# Patient Record
Sex: FEMALE | Race: WHITE | NOT HISPANIC OR LATINO | Employment: FULL TIME | URBAN - NONMETROPOLITAN AREA
[De-identification: names, ages, dates, MRNs, and addresses within clinical notes are randomized per-mention and may not be internally consistent; named-entity substitution may affect disease eponyms.]

---

## 2017-07-17 ENCOUNTER — HOSPITAL ENCOUNTER (EMERGENCY)
Facility: HOSPITAL | Age: 22
Discharge: HOME | End: 2017-07-17
Attending: EMERGENCY MEDICINE | Admitting: EMERGENCY MEDICINE

## 2017-07-17 VITALS
WEIGHT: 135 LBS | HEIGHT: 61 IN | RESPIRATION RATE: 17 BRPM | SYSTOLIC BLOOD PRESSURE: 116 MMHG | OXYGEN SATURATION: 94 % | DIASTOLIC BLOOD PRESSURE: 71 MMHG | TEMPERATURE: 97.7 F | HEART RATE: 87 BPM | BODY MASS INDEX: 25.49 KG/M2

## 2017-07-17 DIAGNOSIS — G43.009 MIGRAINE WITHOUT AURA AND WITHOUT STATUS MIGRAINOSUS, NOT INTRACTABLE: Primary | ICD-10-CM

## 2017-07-17 PROCEDURE — 99282 EMERGENCY DEPT VISIT SF MDM: CPT

## 2017-07-17 PROCEDURE — 99283 EMERGENCY DEPT VISIT LOW MDM: CPT

## 2017-07-17 RX ORDER — ERGOCALCIFEROL (VITAMIN D2) 10 MCG
400 TABLET ORAL DAILY
COMMUNITY
End: 2021-01-20

## 2017-07-17 RX ORDER — ONDANSETRON 4 MG/1
4 TABLET, FILM COATED ORAL EVERY 6 HOURS PRN
Qty: 12 TABLET | Refills: 0 | Status: SHIPPED | OUTPATIENT
Start: 2017-07-17

## 2017-07-17 RX ORDER — BUTALBITAL, ACETAMINOPHEN AND CAFFEINE 50; 325; 40 MG/1; MG/1; MG/1
1 TABLET ORAL EVERY 6 HOURS PRN
Qty: 12 TABLET | Refills: 0 | Status: SHIPPED | OUTPATIENT
Start: 2017-07-17 | End: 2021-01-20

## 2017-07-17 NOTE — ED PROVIDER NOTES
Subjective   HPI Comments: 21 states that she had a migraine headache and vomited once today while at work, she continued emergency department to be evaluated because she was asked to leave work due to her symptoms.  She states that her migraine headache is resolving and is a 4 out of 10 at this time she no longer feels nauseated.  She's had headaches in the past but has been a while.  No visual changes no difficulty with speech no numbness.  He states that the headache was in the front portion of her head nonradiating, denies fever chills or stiff neck      History provided by:  Patient   used: No        Review of Systems   Gastrointestinal: Positive for vomiting.   Neurological: Positive for headaches.   All other systems reviewed and are negative.      Past Medical History:   Diagnosis Date   • Liver disease        No Known Allergies    Past Surgical History:   Procedure Laterality Date   • WISDOM TOOTH EXTRACTION         History reviewed. No pertinent family history.    Social History     Social History   • Marital status:      Spouse name: N/A   • Number of children: N/A   • Years of education: N/A     Social History Main Topics   • Smoking status: Never Smoker   • Smokeless tobacco: None   • Alcohol use None   • Drug use: No   • Sexual activity: Not Asked     Other Topics Concern   • None     Social History Narrative   • None           Objective   Physical Exam   Constitutional: She is oriented to person, place, and time. She appears well-developed and well-nourished.   HENT:   Head: Normocephalic.   Eyes: EOM are normal. Pupils are equal, round, and reactive to light.   Neck: Normal range of motion. Neck supple.   Cardiovascular: Normal rate and regular rhythm.    Pulmonary/Chest: Effort normal and breath sounds normal.   Abdominal: Soft. Bowel sounds are normal.   Musculoskeletal: Normal range of motion.   Neurological: She is alert and oriented to person, place, and time. She has  normal reflexes. No cranial nerve deficit. Coordination normal.   Skin: Skin is warm and dry.   Psychiatric: She has a normal mood and affect. Her behavior is normal. Judgment and thought content normal.   Nursing note and vitals reviewed.      Procedures         ED Course  ED Course                  MDM    Final diagnoses:   Migraine without aura and without status migrainosus, not intractable            Roque Ibarra Jr., PA-C  07/17/17 [id number]

## 2021-01-20 ENCOUNTER — OFFICE VISIT (OUTPATIENT)
Dept: OBSTETRICS AND GYNECOLOGY | Facility: CLINIC | Age: 26
End: 2021-01-20

## 2021-01-20 VITALS
HEIGHT: 62 IN | WEIGHT: 179.4 LBS | BODY MASS INDEX: 33.01 KG/M2 | SYSTOLIC BLOOD PRESSURE: 120 MMHG | DIASTOLIC BLOOD PRESSURE: 76 MMHG

## 2021-01-20 DIAGNOSIS — E28.2 POLYCYSTIC OVARY SYNDROME: ICD-10-CM

## 2021-01-20 DIAGNOSIS — N91.5 OLIGOMENORRHEA, UNSPECIFIED TYPE: Primary | ICD-10-CM

## 2021-01-20 PROCEDURE — 99203 OFFICE O/P NEW LOW 30 MIN: CPT | Performed by: PHYSICIAN ASSISTANT

## 2021-01-20 RX ORDER — MONTELUKAST SODIUM 10 MG/1
10 TABLET ORAL NIGHTLY
COMMUNITY

## 2021-01-20 RX ORDER — MULTIPLE VITAMINS W/ MINERALS TAB 9MG-400MCG
1 TAB ORAL DAILY
COMMUNITY

## 2021-01-20 NOTE — PATIENT INSTRUCTIONS
will check labs today and contact patient with results. Pending review will discuss cycle regulation vs possible ovulation induction with Clomid

## 2021-01-23 LAB
ALBUMIN SERPL-MCNC: 4.5 G/DL (ref 3.5–5.2)
ALBUMIN/GLOB SERPL: 1.6 G/DL
ALP SERPL-CCNC: 171 U/L (ref 39–117)
ALT SERPL-CCNC: 123 U/L (ref 1–33)
AST SERPL-CCNC: 66 U/L (ref 1–32)
BILIRUB SERPL-MCNC: 0.4 MG/DL (ref 0–1.2)
BUN SERPL-MCNC: 13 MG/DL (ref 6–20)
BUN/CREAT SERPL: 19.4 (ref 7–25)
CALCIUM SERPL-MCNC: 9.6 MG/DL (ref 8.6–10.5)
CHLORIDE SERPL-SCNC: 99 MMOL/L (ref 98–107)
CO2 SERPL-SCNC: 27.7 MMOL/L (ref 22–29)
CREAT SERPL-MCNC: 0.67 MG/DL (ref 0.57–1)
FSH SERPL-ACNC: 5.1 MIU/ML
GLOBULIN SER CALC-MCNC: 2.9 GM/DL
GLUCOSE SERPL-MCNC: 128 MG/DL (ref 65–99)
INSULIN SERPL-ACNC: 158 UIU/ML (ref 2.6–24.9)
LH SERPL-ACNC: 9.5 MIU/ML
POTASSIUM SERPL-SCNC: 3.9 MMOL/L (ref 3.5–5.2)
PROLACTIN SERPL-MCNC: 14.9 NG/ML (ref 4.8–23.3)
PROT SERPL-MCNC: 7.4 G/DL (ref 6–8.5)
SODIUM SERPL-SCNC: 138 MMOL/L (ref 136–145)
TESTOST FREE SERPL-MCNC: 4.6 PG/ML (ref 0–4.2)
TESTOST SERPL-MCNC: 69.7 NG/DL (ref 10–55)
TSH SERPL DL<=0.005 MIU/L-ACNC: 2.07 UIU/ML (ref 0.27–4.2)

## 2021-04-15 ENCOUNTER — OFFICE VISIT (OUTPATIENT)
Dept: OBSTETRICS AND GYNECOLOGY | Facility: CLINIC | Age: 26
End: 2021-04-15

## 2021-04-15 VITALS
DIASTOLIC BLOOD PRESSURE: 72 MMHG | HEIGHT: 62 IN | WEIGHT: 169 LBS | BODY MASS INDEX: 31.1 KG/M2 | SYSTOLIC BLOOD PRESSURE: 112 MMHG

## 2021-04-15 DIAGNOSIS — Z32.00 POSSIBLE PREGNANCY, NOT CONFIRMED: ICD-10-CM

## 2021-04-15 DIAGNOSIS — Z79.899 FOLLOW-UP ENCOUNTER INVOLVING MEDICATION: ICD-10-CM

## 2021-04-15 DIAGNOSIS — E28.2 POLYCYSTIC OVARY SYNDROME: Primary | ICD-10-CM

## 2021-04-15 DIAGNOSIS — N91.5 OLIGOMENORRHEA, UNSPECIFIED TYPE: ICD-10-CM

## 2021-04-15 LAB
B-HCG UR QL: NEGATIVE
INTERNAL NEGATIVE CONTROL: NEGATIVE
INTERNAL POSITIVE CONTROL: POSITIVE
Lab: NORMAL

## 2021-04-15 PROCEDURE — 81025 URINE PREGNANCY TEST: CPT | Performed by: PHYSICIAN ASSISTANT

## 2021-04-15 PROCEDURE — 99214 OFFICE O/P EST MOD 30 MIN: CPT | Performed by: PHYSICIAN ASSISTANT

## 2021-04-15 NOTE — PATIENT INSTRUCTIONS
We will continue Metformin 500 mg twice daily.  Recommend checking a serum progesterone level on day 21 of next cycle.  If patient has not conceived within the next 2 to 3 months we will consider ovulation induction.

## 2021-04-15 NOTE — PROGRESS NOTES
Subjective   Chief Complaint   Patient presents with   • Follow-up     3 month follow-up from labs done in January       Carolynn Hernandez is a 25 y.o. year old  presenting to be seen for follow-up.  Patient has been on Metformin for almost 3 months for history of PCOS and anovulation.  She has lost 10 and half pounds since starting Metformin in January.  She reports her cycles have become more regular and she had a cycle in February and also a cycle  .  Has not had a cycle thus far in April.  States she has been using an ovulation predictor kit but has not shown positive ovulation.  She is desiring pregnancy.    Past Medical History:   Diagnosis Date   • Asthma    • Liver disease    • Polycystic ovary syndrome         Current Outpatient Medications:   •  metFORMIN (GLUCOPHAGE) 500 MG tablet, TAKE 1 TABLET BY MOUTH WITH DINNER FOR 1 WEEK THEN INCREASE TO TWICE DAILY WITH MEALS, Disp: , Rfl:   •  montelukast (SINGULAIR) 10 MG tablet, Take 10 mg by mouth Every Night., Disp: , Rfl:   •  multivitamin with minerals tablet tablet, Take 1 tablet by mouth Daily., Disp: , Rfl:   •  ondansetron (ZOFRAN) 4 MG tablet, Take 1 tablet by mouth Every 6 (Six) Hours As Needed for Nausea., Disp: 12 tablet, Rfl: 0   No Known Allergies   Past Surgical History:   Procedure Laterality Date   • WISDOM TOOTH EXTRACTION        Social History     Socioeconomic History   • Marital status:      Spouse name: Not on file   • Number of children: Not on file   • Years of education: Not on file   • Highest education level: Not on file   Tobacco Use   • Smoking status: Never Smoker   • Smokeless tobacco: Never Used   Vaping Use   • Vaping Use: Never used   Substance and Sexual Activity   • Drug use: No   • Sexual activity: Yes     Partners: Male     Birth control/protection: None      Family History   Problem Relation Age of Onset   • Diabetes Father    • Coronary artery disease Father    • Stroke Paternal Grandfather    • Breast  "cancer Maternal Grandmother    • Breast cancer Maternal Aunt        Review of Systems   Constitutional: Negative for chills, diaphoresis and fever.   Gastrointestinal: Negative for abdominal pain, constipation, diarrhea, nausea and vomiting.   Genitourinary: Positive for menstrual problem. Negative for dysuria, pelvic pain, vaginal bleeding and vaginal discharge.           Objective   /72   Ht 157.5 cm (62\")   Wt 76.7 kg (169 lb)   LMP 03/16/2021 (Exact Date)   Breastfeeding No   BMI 30.91 kg/m²     Physical Exam  Constitutional:       Appearance: Normal appearance. She is well-developed and well-groomed.   Eyes:      General: Lids are normal.      Extraocular Movements: Extraocular movements intact.      Conjunctiva/sclera: Conjunctivae normal.   Skin:     General: Skin is warm and dry.      Findings: No lesion.   Neurological:      General: No focal deficit present.      Mental Status: She is alert and oriented to person, place, and time.   Psychiatric:         Attention and Perception: Attention normal.         Mood and Affect: Mood normal.         Speech: Speech normal.         Behavior: Behavior is cooperative.            Result Review :   The following data was reviewed by: Maty Mary PA-C on 04/15/2021:   PCOS labs              Assessment and Plan  Diagnoses and all orders for this visit:    1. Polycystic ovary syndrome (Primary)    2. Oligomenorrhea, unspecified type    3. Follow-up encounter involving medication    4. Possible pregnancy, not confirmed  -     POC Pregnancy, Urine      Patient Instructions   We will continue Metformin 500 mg twice daily.  Recommend checking a serum progesterone level on day 21 of next cycle.  If patient has not conceived within the next 2 to 3 months we will consider ovulation induction.             This note was electronically signed.    Maty Mary PA-C   April 15, 2021  "

## 2021-04-28 ENCOUNTER — TELEPHONE (OUTPATIENT)
Dept: OBSTETRICS AND GYNECOLOGY | Facility: CLINIC | Age: 26
End: 2021-04-28

## 2021-04-28 NOTE — TELEPHONE ENCOUNTER
----- Message from Carolynn Hernandez sent at 4/27/2021  8:36 PM EDT -----  Regarding: Visit Follow-Up Question  Contact: [phone]  I know I am suppose to do the bloodwork for ovulation 21 days after my period starts. My period has not came this month at all. I don't know why and haven't really done anything differently than usual. So I was wondering if there was anything I needed to do.

## 2021-04-29 NOTE — TELEPHONE ENCOUNTER
Please inform patient need to wait for a period to check progesterone level on day 21 of cycle. Thanks

## 2021-05-20 ENCOUNTER — TELEPHONE (OUTPATIENT)
Dept: OBSTETRICS AND GYNECOLOGY | Facility: CLINIC | Age: 26
End: 2021-05-20

## 2021-05-26 ENCOUNTER — TELEPHONE (OUTPATIENT)
Dept: OBSTETRICS AND GYNECOLOGY | Facility: CLINIC | Age: 26
End: 2021-05-26

## 2021-06-01 ENCOUNTER — TELEPHONE (OUTPATIENT)
Dept: OBSTETRICS AND GYNECOLOGY | Facility: CLINIC | Age: 26
End: 2021-06-01

## 2021-06-01 NOTE — TELEPHONE ENCOUNTER
----- Message from Carolynn Hernandez sent at 5/28/2021  7:04 PM EDT -----  Regarding: RE:message  Contact: [phone]  I saw where Mireille tried to call me on Thursday and I tried to call her back twice and they kept me on hold for like 30 mins each time. I was just trying to call her back and see what the game plan is .

## 2021-06-02 DIAGNOSIS — N91.2 AMENORRHEA: Primary | ICD-10-CM

## 2021-06-02 RX ORDER — PROGESTERONE 200 MG/1
CAPSULE ORAL
Qty: 20 CAPSULE | Refills: 0 | Status: SHIPPED | OUTPATIENT
Start: 2021-06-02

## 2021-06-02 NOTE — TELEPHONE ENCOUNTER
Spoke with patient.  She still has not had menses since March 9.  Reports she had a negative home pregnancy test last week.  Orders placed for hCG at the outpatient lab which patient will do tomorrow.  We will then do progesterone for 10 days for withdrawal bleed and then Clomid days 5 through 9 of her cycle.

## 2021-06-03 ENCOUNTER — LAB (OUTPATIENT)
Dept: LAB | Facility: HOSPITAL | Age: 26
End: 2021-06-03

## 2021-06-03 DIAGNOSIS — N91.2 AMENORRHEA: ICD-10-CM

## 2021-06-03 LAB — HCG INTACT+B SERPL-ACNC: <0.1 MIU/ML

## 2021-06-03 PROCEDURE — 36415 COLL VENOUS BLD VENIPUNCTURE: CPT

## 2021-06-03 PROCEDURE — 84702 CHORIONIC GONADOTROPIN TEST: CPT

## 2021-06-10 ENCOUNTER — TELEPHONE (OUTPATIENT)
Dept: OBSTETRICS AND GYNECOLOGY | Facility: CLINIC | Age: 26
End: 2021-06-10

## 2021-06-10 DIAGNOSIS — E28.2 POLYCYSTIC OVARY SYNDROME: Primary | ICD-10-CM

## 2021-06-10 DIAGNOSIS — R74.8 ELEVATED LIVER ENZYMES: ICD-10-CM

## 2021-06-10 NOTE — TELEPHONE ENCOUNTER
----- Message from Carolynn Hernandez sent at 6/9/2021  9:54 PM EDT -----  Regarding: Prescription Question  Contact: [phone]  Hey I tried to get my metformin filler but they said they needed a call back. I took my last dose today.

## 2021-06-10 NOTE — TELEPHONE ENCOUNTER
Please let her know I have sent in 3 refills. I would like to recheck CMP as liver enzymes elevated and need to monitor this with metformin. Thanks

## 2021-07-01 ENCOUNTER — TRANSCRIBE ORDERS (OUTPATIENT)
Dept: ADMINISTRATIVE | Facility: HOSPITAL | Age: 26
End: 2021-07-01

## 2021-07-01 DIAGNOSIS — N63.0 BREAST NODULE: Primary | ICD-10-CM

## 2021-07-05 ENCOUNTER — LAB (OUTPATIENT)
Dept: LAB | Facility: HOSPITAL | Age: 26
End: 2021-07-05

## 2021-07-05 DIAGNOSIS — E28.2 POLYCYSTIC OVARY SYNDROME: ICD-10-CM

## 2021-07-05 DIAGNOSIS — R74.8 ELEVATED LIVER ENZYMES: ICD-10-CM

## 2021-07-05 LAB
ALBUMIN SERPL-MCNC: 4.7 G/DL (ref 3.5–5.2)
ALBUMIN/GLOB SERPL: 1.8 G/DL
ALP SERPL-CCNC: 149 U/L (ref 39–117)
ALT SERPL W P-5'-P-CCNC: 175 U/L (ref 1–33)
ANION GAP SERPL CALCULATED.3IONS-SCNC: 12.2 MMOL/L (ref 5–15)
AST SERPL-CCNC: 118 U/L (ref 1–32)
BILIRUB SERPL-MCNC: 0.3 MG/DL (ref 0–1.2)
BUN SERPL-MCNC: 11 MG/DL (ref 6–20)
BUN/CREAT SERPL: 14.9 (ref 7–25)
CALCIUM SPEC-SCNC: 9.6 MG/DL (ref 8.6–10.5)
CHLORIDE SERPL-SCNC: 100 MMOL/L (ref 98–107)
CO2 SERPL-SCNC: 24.8 MMOL/L (ref 22–29)
CREAT SERPL-MCNC: 0.74 MG/DL (ref 0.57–1)
GFR SERPL CREATININE-BSD FRML MDRD: 95 ML/MIN/1.73
GLOBULIN UR ELPH-MCNC: 2.6 GM/DL
GLUCOSE SERPL-MCNC: 92 MG/DL (ref 65–99)
POTASSIUM SERPL-SCNC: 4.4 MMOL/L (ref 3.5–5.2)
PROT SERPL-MCNC: 7.3 G/DL (ref 6–8.5)
SODIUM SERPL-SCNC: 137 MMOL/L (ref 136–145)

## 2021-07-05 PROCEDURE — 80053 COMPREHEN METABOLIC PANEL: CPT

## 2021-07-05 PROCEDURE — 36415 COLL VENOUS BLD VENIPUNCTURE: CPT

## 2021-07-07 ENCOUNTER — TELEPHONE (OUTPATIENT)
Dept: OBSTETRICS AND GYNECOLOGY | Facility: CLINIC | Age: 26
End: 2021-07-07

## 2021-07-07 DIAGNOSIS — N83.9 PROBLEMS WITH OVULATION: Primary | ICD-10-CM

## 2021-07-07 PROCEDURE — 36415 COLL VENOUS BLD VENIPUNCTURE: CPT | Performed by: PHYSICIAN ASSISTANT

## 2021-07-07 PROCEDURE — 84144 ASSAY OF PROGESTERONE: CPT | Performed by: PHYSICIAN ASSISTANT

## 2021-07-07 NOTE — TELEPHONE ENCOUNTER
----- Message from Carolynn Hernandez sent at 7/6/2021 11:54 PM EDT -----  Regarding: Test Results Question  Contact: [phone]  I was wondering if they did the draw to check for the ovulation? I didn't see it in the labs because that was the whole reason for coming on the 5th.

## 2021-07-07 NOTE — TELEPHONE ENCOUNTER
Please let patient know that progesterone level  was not on the order with CMP, however, I have spoken to Torsten in the lab and they can add the progesterone to the blood drawn on July 5 so I placed order for them to add that on and should be back in 1-2 days. Thanks

## 2021-07-08 ENCOUNTER — TELEPHONE (OUTPATIENT)
Dept: OBSTETRICS AND GYNECOLOGY | Facility: CLINIC | Age: 26
End: 2021-07-08

## 2021-07-08 LAB — PROGEST SERPL-MCNC: <0.1 NG/ML

## 2021-07-08 NOTE — TELEPHONE ENCOUNTER
Patient also wanted to see if you thought the Metformin could be the reason she has elevated liver enzymes?

## 2021-07-08 NOTE — TELEPHONE ENCOUNTER
----- Message from Carolynn Hernandez sent at 7/8/2021  2:41 PM EDT -----  Regarding: Non-Urgent Medical Question  Contact: [phone]  I am just emailing back to say that my gi isn't going to start me on any medication and just suggest that I lose weight. I would like to continue at least to try to get my periods on track. I don't understand why they aren't on track and why I am not ovulating. If you don't suggest the fertility medication that is fine but I would at least like to fix the problem at hand.

## 2021-07-10 NOTE — TELEPHONE ENCOUNTER
Please let patient know that as far as regulating her menses the best option would be to do a cyclic progesterone every month to induce  menses.  With her elevated liver enzymes, the estrogen in other birth control could potentially elevate her enzymes even more.  Also let her know that her liver enzymes were already elevated before she started metformin and it is very unlikely that metformin contributed to the further increase in her enzymes. It could just be fatty liver as her GI has diagnosed. If she does lose weight, this will be very beneficial also to increase chances of ovulation and regulation of her menses. Thanks

## 2021-07-27 ENCOUNTER — TELEPHONE (OUTPATIENT)
Dept: OBSTETRICS AND GYNECOLOGY | Facility: CLINIC | Age: 26
End: 2021-07-27

## 2021-07-27 NOTE — TELEPHONE ENCOUNTER
Please let patient know if her GI physician is OK with patient resuming Metformin I think it is OK to go back on the 500 mg bid with meals. Thanks

## 2021-07-27 NOTE — TELEPHONE ENCOUNTER
----- Message from Carolynn Hernandez sent at 7/26/2021  7:44 PM EDT -----  Regarding: RE: Non-Urgent Medical Question  Contact: [phone]  So is it okay for me to start it back.

## 2021-08-09 ENCOUNTER — HOSPITAL ENCOUNTER (OUTPATIENT)
Dept: MAMMOGRAPHY | Facility: HOSPITAL | Age: 26
Discharge: HOME | End: 2021-08-09

## 2021-08-09 ENCOUNTER — HOSPITAL ENCOUNTER (OUTPATIENT)
Dept: ULTRASOUND IMAGING | Facility: HOSPITAL | Age: 26
Discharge: HOME | End: 2021-08-09

## 2021-08-09 DIAGNOSIS — N63.0 BREAST NODULE: ICD-10-CM

## 2021-08-09 PROCEDURE — 76642 ULTRASOUND BREAST LIMITED: CPT

## 2021-11-18 ENCOUNTER — TRANSCRIBE ORDERS (OUTPATIENT)
Dept: ADMINISTRATIVE | Facility: HOSPITAL | Age: 26
End: 2021-11-18

## 2021-11-18 DIAGNOSIS — R92.8 ABNORMAL FINDINGS ON DIAGNOSTIC IMAGING OF BREAST: Primary | ICD-10-CM

## 2022-02-03 ENCOUNTER — HOSPITAL ENCOUNTER (OUTPATIENT)
Dept: MAMMOGRAPHY | Facility: HOSPITAL | Age: 27
End: 2022-02-03

## 2022-02-08 ENCOUNTER — HOSPITAL ENCOUNTER (OUTPATIENT)
Dept: ULTRASOUND IMAGING | Facility: HOSPITAL | Age: 27
Discharge: HOME | End: 2022-02-08

## 2022-02-08 ENCOUNTER — HOSPITAL ENCOUNTER (OUTPATIENT)
Dept: MAMMOGRAPHY | Facility: HOSPITAL | Age: 27
Discharge: HOME | End: 2022-02-08

## 2022-02-08 DIAGNOSIS — R92.8 ABNORMAL FINDINGS ON DIAGNOSTIC IMAGING OF BREAST: ICD-10-CM

## 2022-02-08 PROCEDURE — 76642 ULTRASOUND BREAST LIMITED: CPT

## 2022-04-01 NOTE — PROGRESS NOTES
Subjective   Chief Complaint   Patient presents with   • Menstrual Problem     Patient states that she only has a period every 3 months.  Trying to conceive.  Patient was told that she has PCOS       Carolynn Hernandez is a 25 y.o. year old  presenting to be seen for irregular menses and she is desiring conception.  Patient reports a previous diagnosis of polycystic ovary syndrome around the age of 18 or 19.  Patient had had ParaGard IUD in for about 1-1/2 to 2 years and IUD was removed in 2020.  She reports that she did have a monthly regular bleed with the ParaGard.  She reports after ParaGard was removed she has been having periods about every 3 months with moderate flow lasting 7-8 days.  She had a last menses  and the menses prior to that was in September which was triggered by progesterone given by another provider.   She reports issues with acne for a few years.  She has had about a 50 pound weight gain in the last 2 years.  She reports having normal Pap smear 1 to 2 weeks ago with her primary care provider.  Reports her  is age 25 and healthy.    Past Medical History:   Diagnosis Date   • Asthma    • Liver disease    • Polycystic ovary syndrome         Current Outpatient Medications:   •  montelukast (SINGULAIR) 10 MG tablet, Take 10 mg by mouth Every Night., Disp: , Rfl:   •  multivitamin with minerals tablet tablet, Take 1 tablet by mouth Daily., Disp: , Rfl:   •  ondansetron (ZOFRAN) 4 MG tablet, Take 1 tablet by mouth Every 6 (Six) Hours As Needed for Nausea., Disp: 12 tablet, Rfl: 0   No Known Allergies   Past Surgical History:   Procedure Laterality Date   • WISDOM TOOTH EXTRACTION        Social History     Socioeconomic History   • Marital status:      Spouse name: Not on file   • Number of children: Not on file   • Years of education: Not on file   • Highest education level: Not on file   Tobacco Use   • Smoking status: Never Smoker   • Smokeless tobacco: Never Used  "  Substance and Sexual Activity   • Drug use: No   • Sexual activity: Yes     Partners: Male     Birth control/protection: None      Family History   Problem Relation Age of Onset   • Diabetes Father    • Coronary artery disease Father    • Stroke Paternal Grandfather    • Breast cancer Maternal Grandmother    • Breast cancer Maternal Aunt        Review of Systems   Constitutional:        Weight gain   Gastrointestinal: Negative.    Genitourinary: Positive for menstrual problem. Negative for dysuria and vaginal discharge.   All other systems reviewed and are negative.          Objective   /76   Ht 157.5 cm (62\")   Wt 81.4 kg (179 lb 6.4 oz)   LMP 01/09/2021 (Exact Date)   Breastfeeding No   BMI 32.81 kg/m²     Physical Exam  Constitutional:       Appearance: Normal appearance. She is well-developed and well-groomed. She is obese.   Eyes:      General: Lids are normal.      Extraocular Movements: Extraocular movements intact.      Conjunctiva/sclera: Conjunctivae normal.   Abdominal:      Palpations: Abdomen is soft.      Tenderness: There is no abdominal tenderness. There is no guarding.   Genitourinary:     Labia:         Right: No rash, tenderness or lesion.         Left: No rash, tenderness or lesion.       Urethra: No prolapse, urethral pain, urethral swelling or urethral lesion.      Vagina: No vaginal discharge, erythema, tenderness, bleeding or lesions.      Cervix: No cervical motion tenderness, discharge, friability, lesion, erythema or eversion.      Uterus: Not enlarged and not tender.       Adnexa:         Right: No mass or tenderness.          Left: No mass or tenderness.     Skin:     General: Skin is warm and dry.      Findings: No lesion or rash.      Comments: Mild acne cheeks and chin   Neurological:      General: No focal deficit present.      Mental Status: She is alert and oriented to person, place, and time.   Psychiatric:         Attention and Perception: Attention normal.         " Mood and Affect: Mood normal.         Speech: Speech normal.         Behavior: Behavior is cooperative.         Thought Content: Thought content normal.              Assessment and Plan  Diagnoses and all orders for this visit:    1. Oligomenorrhea, unspecified type (Primary)  -     Testosterone (Free & Total), LC / MS  -     Insulin, Total  -     Comprehensive Metabolic Panel  -     Follicle Stimulating Hormone  -     Luteinizing Hormone  -     TSH  -     Prolactin    2. Polycystic ovary syndrome  -     Testosterone (Free & Total), LC / MS  -     Insulin, Total  -     Comprehensive Metabolic Panel  -     Follicle Stimulating Hormone  -     Luteinizing Hormone  -     TSH  -     Prolactin      Patient Instructions   will check labs today and contact patient with results. Pending review will discuss cycle regulation vs possible ovulation induction with Clomid              This note was electronically signed.    Maty Mary PA-C   January 20, 2021   Attending Discharge Physical Examination:

## 2025-03-18 ENCOUNTER — TRANSCRIBE ORDERS (OUTPATIENT)
Dept: ADMINISTRATIVE | Facility: HOSPITAL | Age: 30
End: 2025-03-18
Payer: OTHER GOVERNMENT

## 2025-03-18 DIAGNOSIS — Z12.31 VISIT FOR SCREENING MAMMOGRAM: Primary | ICD-10-CM

## 2025-08-05 ENCOUNTER — HOSPITAL ENCOUNTER (OUTPATIENT)
Dept: MAMMOGRAPHY | Facility: HOSPITAL | Age: 30
Discharge: HOME | End: 2025-08-05
Admitting: FAMILY MEDICINE
Payer: OTHER GOVERNMENT

## 2025-08-05 DIAGNOSIS — Z12.31 VISIT FOR SCREENING MAMMOGRAM: ICD-10-CM

## 2025-08-05 PROCEDURE — 77067 SCR MAMMO BI INCL CAD: CPT

## 2025-08-05 PROCEDURE — 77063 BREAST TOMOSYNTHESIS BI: CPT
